# Patient Record
Sex: FEMALE | Race: WHITE | Employment: PART TIME | URBAN - METROPOLITAN AREA
[De-identification: names, ages, dates, MRNs, and addresses within clinical notes are randomized per-mention and may not be internally consistent; named-entity substitution may affect disease eponyms.]

---

## 2017-06-01 ENCOUNTER — OFFICE VISIT (OUTPATIENT)
Dept: FAMILY MEDICINE CLINIC | Facility: CLINIC | Age: 52
End: 2017-06-01

## 2017-06-01 VITALS
SYSTOLIC BLOOD PRESSURE: 120 MMHG | HEART RATE: 72 BPM | HEIGHT: 68 IN | TEMPERATURE: 97 F | DIASTOLIC BLOOD PRESSURE: 80 MMHG | WEIGHT: 201 LBS | BODY MASS INDEX: 30.46 KG/M2 | RESPIRATION RATE: 16 BRPM

## 2017-06-01 DIAGNOSIS — E06.3 HYPOTHYROIDISM DUE TO HASHIMOTO'S THYROIDITIS: ICD-10-CM

## 2017-06-01 DIAGNOSIS — Z83.3 FAMILY HISTORY OF DIABETES MELLITUS (DM): ICD-10-CM

## 2017-06-01 DIAGNOSIS — Z01.89 ROUTINE LAB DRAW: ICD-10-CM

## 2017-06-01 DIAGNOSIS — E03.8 HYPOTHYROIDISM DUE TO HASHIMOTO'S THYROIDITIS: ICD-10-CM

## 2017-06-01 DIAGNOSIS — Z82.49 FAMILY HISTORY OF CARDIOMYOPATHY: Primary | ICD-10-CM

## 2017-06-01 PROCEDURE — 99202 OFFICE O/P NEW SF 15 MIN: CPT | Performed by: NURSE PRACTITIONER

## 2017-06-01 NOTE — PATIENT INSTRUCTIONS
Thank you for choosing [facility] at [place] [address]  To Do: Annie Padilla  1.  Schedule appt for ECHO  2. Schedule for physical exam (Dr. Rubi Barrett) and blood work    • Please signup for [facility] CHART, which is electronic access to your record your quality of life.     Referrals, and Radiology Information:    If your insurance requires a referral to a specialist, please allow 5 business days to process your referral request.    If [facility] Mcdowell orders a CT or MRI, it may take up to 10 bus

## 2017-06-01 NOTE — PROGRESS NOTES
[facility] Internal Medicine Office Note  Chief Complaint:   Patient presents with:  Family Hx Of Heart Disease: states her Mom was DX with Left ventricular Noncompaction Cardiomyopathy, she was told to get Echo done for screening.  States for the Rfl:          REVIEW OF SYSTEMS:   Review of Systems   Constitutional: Negative for fever, chills and fatigue. HENT: Negative for congestion, ear pain, postnasal drip, sinus pressure and trouble swallowing. Eyes: Negative.     Respiratory: Negative for cardiomyopathy  -     CARD ECHO 2D DOPPLER (CPT=93306); Future    1.   Mother was recently DX with Left ventricular Noncompaction Cardiomyopathy, she was told to get Echo done for screening per mothers Cardiologist.    2. See detailed HPI- random symptom ev immunity disorders     Screening for lipoid disorders     Screening for iron deficiency anemia     Otalgia, unspecified     Unspecified asthma(493.90)     Family history of breast cancer     Hypothyroidism due to Hashimoto's thyroiditis      Networked refe

## 2017-06-15 ENCOUNTER — HOSPITAL ENCOUNTER (OUTPATIENT)
Dept: CV DIAGNOSTICS | Age: 52
Discharge: HOME | End: 2017-06-15
Attending: NURSE PRACTITIONER
Payer: COMMERCIAL

## 2017-06-15 DIAGNOSIS — Z82.49 FAMILY HISTORY OF CARDIOMYOPATHY: ICD-10-CM

## 2017-06-15 PROCEDURE — 93306 TTE W/DOPPLER COMPLETE: CPT | Performed by: NURSE PRACTITIONER

## 2017-06-19 NOTE — PROGRESS NOTES
Quick Note:    Your ECHO is a normal study. It does shows mild diastolic dysfunction, but w/ normal size, normal ejection fraction (pumping). We need to make sure your blood pressure is within normal limits (which was normal with last visit).  Healthy diet,

## 2017-07-05 NOTE — ADDENDUM NOTE
Encounter addended by: Stanley Bettencourt MA on: 7/5/2017  8:51 AM<BR>    Actions taken: Letter status changed

## 2017-07-19 ENCOUNTER — APPOINTMENT (OUTPATIENT)
Dept: LAB | Age: 52
End: 2017-07-19
Attending: OBSTETRICS & GYNECOLOGY
Payer: COMMERCIAL

## 2017-07-22 ENCOUNTER — OFFICE VISIT (OUTPATIENT)
Dept: FAMILY MEDICINE CLINIC | Facility: CLINIC | Age: 52
End: 2017-07-22

## 2017-07-22 VITALS
BODY MASS INDEX: 30.06 KG/M2 | DIASTOLIC BLOOD PRESSURE: 70 MMHG | HEIGHT: 68 IN | TEMPERATURE: 98 F | HEART RATE: 72 BPM | RESPIRATION RATE: 16 BRPM | SYSTOLIC BLOOD PRESSURE: 110 MMHG | WEIGHT: 198.38 LBS

## 2017-07-22 DIAGNOSIS — Z00.00 WELLNESS EXAMINATION: Primary | ICD-10-CM

## 2017-07-22 DIAGNOSIS — Z12.11 SCREENING FOR COLON CANCER: ICD-10-CM

## 2017-07-22 PROCEDURE — 99386 PREV VISIT NEW AGE 40-64: CPT | Performed by: INTERNAL MEDICINE

## 2017-07-22 NOTE — PATIENT INSTRUCTIONS
Thank you for choosing Paul Buckley MD at [place] [address]  To Do: Zander Cabrera  1. Increase exercise  2. Call to schedule appointment with gastroenterology  3.  Follow up for physical and blood work in 1 year  Effective 6/19/17 until November 20 today.  All therapies have potential risk of harm or side effects or medication interactions.  It is your duty and for your safety to discuss with the pharmacist and our office with questions, and to notify us and stop treatment if problems arise, but know

## 2017-07-22 NOTE — PROGRESS NOTES
[facility] Internal Medicine Office Note  Chief Complaint:   Patient presents with:  Wellness Visit: last labs 7/19/17      HPI:   This is a 47year old female coming in for physical    She is feeling well     She is up to date with mammo, pap sm Prescriptions:  SYNTHROID 125 MCG Oral Tab Take 1 tablet (125 mcg total) by mouth before breakfast. Disp: 90 tablet Rfl: 1   Mometasone Furoate (ASMANEX 120 METERED DOSES) 220 MCG/INH Inhalation Aerosol Powder, Breath Activated Inhale 1 puff into the lungs posterior oropharyngeal erythema. Eyes: Conjunctivae are normal.   Neck: Neck supple. No thyromegaly present. Cardiovascular: Normal rate, regular rhythm and normal heart sounds.     Pulmonary/Chest: Effort normal and breath sounds normal.   Abdominal:

## 2017-10-04 ENCOUNTER — HOSPITAL ENCOUNTER (OUTPATIENT)
Dept: MAMMOGRAPHY | Age: 52
Discharge: HOME | End: 2017-10-04
Attending: OBSTETRICS & GYNECOLOGY
Payer: COMMERCIAL

## 2017-10-04 DIAGNOSIS — Z12.31 ENCOUNTER FOR SCREENING MAMMOGRAM FOR MALIGNANT NEOPLASM OF BREAST: ICD-10-CM

## 2017-10-04 PROCEDURE — 77067 SCR MAMMO BI INCL CAD: CPT | Performed by: OBSTETRICS & GYNECOLOGY

## 2017-10-04 PROCEDURE — 77063 BREAST TOMOSYNTHESIS BI: CPT | Performed by: OBSTETRICS & GYNECOLOGY

## 2017-10-18 ENCOUNTER — APPOINTMENT (OUTPATIENT)
Dept: LAB | Age: 52
End: 2017-10-18
Attending: INTERNAL MEDICINE
Payer: COMMERCIAL

## 2017-10-18 DIAGNOSIS — E03.8 HYPOTHYROIDISM DUE TO HASHIMOTO'S THYROIDITIS: ICD-10-CM

## 2017-10-18 DIAGNOSIS — E06.3 HYPOTHYROIDISM DUE TO HASHIMOTO'S THYROIDITIS: ICD-10-CM

## 2017-10-18 DIAGNOSIS — E03.9 HYPOTHYROIDISM, UNSPECIFIED TYPE: ICD-10-CM

## 2017-10-18 PROCEDURE — 84439 ASSAY OF FREE THYROXINE: CPT

## 2017-10-18 PROCEDURE — 84443 ASSAY THYROID STIM HORMONE: CPT

## 2017-10-18 PROCEDURE — 36415 COLL VENOUS BLD VENIPUNCTURE: CPT

## 2017-12-08 ENCOUNTER — MED REC SCAN ONLY (OUTPATIENT)
Dept: FAMILY MEDICINE CLINIC | Facility: CLINIC | Age: 52
End: 2017-12-08

## 2018-02-04 ENCOUNTER — OFFICE VISIT (OUTPATIENT)
Dept: FAMILY MEDICINE CLINIC | Facility: CLINIC | Age: 53
End: 2018-02-04

## 2018-02-04 VITALS
SYSTOLIC BLOOD PRESSURE: 112 MMHG | HEART RATE: 96 BPM | OXYGEN SATURATION: 98 % | DIASTOLIC BLOOD PRESSURE: 64 MMHG | BODY MASS INDEX: 30.16 KG/M2 | RESPIRATION RATE: 20 BRPM | HEIGHT: 68 IN | TEMPERATURE: 99 F | WEIGHT: 199 LBS

## 2018-02-04 DIAGNOSIS — J02.9 SORE THROAT: ICD-10-CM

## 2018-02-04 DIAGNOSIS — J11.1 INFLUENZA-LIKE ILLNESS: Primary | ICD-10-CM

## 2018-02-04 LAB — CONTROL LINE PRESENT WITH A CLEAR BACKGROUND (YES/NO): YES YES/NO

## 2018-02-04 PROCEDURE — 87880 STREP A ASSAY W/OPTIC: CPT | Performed by: PHYSICIAN ASSISTANT

## 2018-02-04 PROCEDURE — 99213 OFFICE O/P EST LOW 20 MIN: CPT | Performed by: PHYSICIAN ASSISTANT

## 2018-02-04 RX ORDER — OSELTAMIVIR PHOSPHATE 75 MG/1
75 CAPSULE ORAL 2 TIMES DAILY
Qty: 10 CAPSULE | Refills: 0 | Status: SHIPPED | OUTPATIENT
Start: 2018-02-04 | End: 2018-02-09

## 2018-02-04 RX ORDER — OSELTAMIVIR PHOSPHATE 75 MG/1
75 CAPSULE ORAL 2 TIMES DAILY
Qty: 10 CAPSULE | Refills: 0 | Status: SHIPPED | OUTPATIENT
Start: 2018-02-04 | End: 2018-02-04

## 2018-02-04 NOTE — PROGRESS NOTES
CHIEF COMPLAINT:   Patient presents with:  Fever: bodyaches and sore throat x this morning      HPI:   Timoteo Mccollum is a 47year old female who presents for sudden onset of flu-like symptoms. Symptoms began this morning.   Patient reports sudden onset REVIEW OF SYSTEMS:   GENERAL:   Normal appetite.     SKIN: no rashes or abnormal skin lesions  HEENT: See HPI  LUNGS: denies shortness of breath or wheezing, See HPI  CARDIOVASCULAR: denies chest pain or palpitations   GI: denies abdominal pain      EXAM: PLAN:  Counseled on Tamiflu. Explained that Tamiflu may lessen severity and duration of symptoms, but will not completely remove symptoms. Side effects of medication discussed. Patient and I agree that Tamiflu is appropriate.  Comfort care as described · Nausea and loss of appetite are common with the flu. Eat light meals. Drink 6 to 8 glasses of liquids every day. Good choices are water, sport drinks, soft drinks without caffeine, juices, tea, and soup.  Extra fluids will also help loosen secretions in y

## 2018-02-04 NOTE — PATIENT INSTRUCTIONS
1. Tamiflu  2. Tylenol/ Motrin  3. Increase fluids/ rest  4. At home inhalers as needed  5. If worsening symptoms seek treatment  6. Follow up with PCP  Influenza (Adult)    Influenza is also called the flu.  It is a viral illness that affects the air pas · Stay home until your fever has been gone for at least 24 hours without using medicine to reduce fever. Follow-up care  Follow up with your healthcare provider, or as advised, if you are not getting better over the next week.   If you are age 72 or older,

## 2018-09-27 ENCOUNTER — HOSPITAL ENCOUNTER (OUTPATIENT)
Dept: GENERAL RADIOLOGY | Age: 53
Discharge: HOME | End: 2018-09-27
Attending: FAMILY MEDICINE
Payer: COMMERCIAL

## 2018-09-27 ENCOUNTER — OFFICE VISIT (OUTPATIENT)
Dept: FAMILY MEDICINE CLINIC | Facility: CLINIC | Age: 53
End: 2018-09-27
Payer: COMMERCIAL

## 2018-09-27 VITALS
HEART RATE: 98 BPM | HEIGHT: 68 IN | OXYGEN SATURATION: 98 % | RESPIRATION RATE: 16 BRPM | WEIGHT: 205 LBS | DIASTOLIC BLOOD PRESSURE: 80 MMHG | SYSTOLIC BLOOD PRESSURE: 122 MMHG | TEMPERATURE: 98 F | BODY MASS INDEX: 31.07 KG/M2

## 2018-09-27 DIAGNOSIS — Z23 FLU VACCINE NEED: ICD-10-CM

## 2018-09-27 DIAGNOSIS — M75.102 ROTATOR CUFF SYNDROME OF LEFT SHOULDER: ICD-10-CM

## 2018-09-27 DIAGNOSIS — J45.20 MILD INTERMITTENT ASTHMA, UNSPECIFIED WHETHER COMPLICATED: ICD-10-CM

## 2018-09-27 DIAGNOSIS — Z13.220 LIPID SCREENING: ICD-10-CM

## 2018-09-27 DIAGNOSIS — M54.2 NECK PAIN: ICD-10-CM

## 2018-09-27 DIAGNOSIS — Z12.11 COLON CANCER SCREENING: ICD-10-CM

## 2018-09-27 DIAGNOSIS — Z00.00 WELLNESS EXAMINATION: Primary | ICD-10-CM

## 2018-09-27 PROCEDURE — 90471 IMMUNIZATION ADMIN: CPT | Performed by: FAMILY MEDICINE

## 2018-09-27 PROCEDURE — 90686 IIV4 VACC NO PRSV 0.5 ML IM: CPT | Performed by: FAMILY MEDICINE

## 2018-09-27 PROCEDURE — 73030 X-RAY EXAM OF SHOULDER: CPT | Performed by: FAMILY MEDICINE

## 2018-09-27 PROCEDURE — 72050 X-RAY EXAM NECK SPINE 4/5VWS: CPT | Performed by: FAMILY MEDICINE

## 2018-09-27 PROCEDURE — 99386 PREV VISIT NEW AGE 40-64: CPT | Performed by: FAMILY MEDICINE

## 2018-09-27 PROCEDURE — 99203 OFFICE O/P NEW LOW 30 MIN: CPT | Performed by: FAMILY MEDICINE

## 2018-09-27 NOTE — PATIENT INSTRUCTIONS
Shoulder Clock Exercise    To start, stand tall with your ears, shoulders, and hips in line. Your feet should be slightly apart, positioned just under your hips. Focus your eyes directly in front of you.   this position for a few seconds b · Move your arms back, squeezing your shoulder blades together. · Hold for 10 seconds.  Return to starting position.   · Repeat 5 times.   For your safety, check with your healthcare provider before starting an exercise program.   Date Last Reviewed: 11/1/

## 2018-09-27 NOTE — PROGRESS NOTES
Patient presents with:  Shoulder Pain: L shoulder pain x2weeks   Establish Care: new to PCP      HPI:    Left shoulder pain: Onset of symptoms: 3 weeks. The patient does not describe a history of trauma.  The patient is right hand dominant and describes mikki fatigue   HENT: Negative for hearing loss, congestion, sore throat    Eyes: Negative for pain and visual disturbance. Respiratory: Negative for cough, chest tightness, shortness of breath and wheezing.     Cardiovascular: Negative for chest pain, palpitat • Cancer Other    • Hypertension Father    • Heart Disorder Father         stent   • Breast Cancer Paternal Aunt         63's   • Breast Cancer Paternal Cousin Female         late 29's early 42's   • Breast Cancer Paternal Cousin Female         late 29's normal. Oropharynx is clear and moist.   Eyes: Conjunctivae and EOM are normal. PERRLA. Neck: Normal range of motion. Neck supple. Normal carotid pulses   Cardiovascular: Normal rate, regular rhythm and intact distal pulses. No murmur, rubs or gallops. cervical cancer in women ages 24 to 72 years with cytology (Pap smear) every 3 years or, for women ages 27 to 72 years who want to lengthen the screening interval, screening with a combination of cytology and human papillomavirus (HPV) testing every 5 year GASTROENTEROLOGY      Return if symptoms worsen or fail to improve.

## 2018-09-28 PROBLEM — J45.20 MILD INTERMITTENT ASTHMA: Status: ACTIVE | Noted: 2018-09-28

## 2018-09-28 PROBLEM — M48.02 CERVICAL SPINAL STENOSIS: Status: ACTIVE | Noted: 2018-09-28

## 2018-09-28 PROBLEM — J45.20 MILD INTERMITTENT ASTHMA (HCC): Status: ACTIVE | Noted: 2018-09-28

## 2018-09-28 PROBLEM — M54.2 NECK PAIN: Status: ACTIVE | Noted: 2018-09-28

## 2018-09-28 PROBLEM — M54.12 CERVICAL RADICULOPATHY: Status: ACTIVE | Noted: 2018-09-28

## 2018-09-28 PROBLEM — M75.102 ROTATOR CUFF SYNDROME OF LEFT SHOULDER: Status: ACTIVE | Noted: 2018-09-28

## 2018-09-28 PROBLEM — M75.82 TENDINITIS OF LEFT ROTATOR CUFF: Status: ACTIVE | Noted: 2018-09-28

## 2018-10-24 ENCOUNTER — APPOINTMENT (OUTPATIENT)
Dept: LAB | Age: 53
End: 2018-10-24
Attending: INTERNAL MEDICINE
Payer: COMMERCIAL

## 2018-10-24 DIAGNOSIS — E03.8 HYPOTHYROIDISM DUE TO HASHIMOTO'S THYROIDITIS: ICD-10-CM

## 2018-10-24 DIAGNOSIS — E06.3 HYPOTHYROIDISM DUE TO HASHIMOTO'S THYROIDITIS: ICD-10-CM

## 2018-10-24 PROCEDURE — 80053 COMPREHEN METABOLIC PANEL: CPT

## 2018-10-24 PROCEDURE — 83036 HEMOGLOBIN GLYCOSYLATED A1C: CPT

## 2018-10-24 PROCEDURE — 36415 COLL VENOUS BLD VENIPUNCTURE: CPT

## 2018-10-31 ENCOUNTER — HOSPITAL ENCOUNTER (OUTPATIENT)
Dept: MAMMOGRAPHY | Age: 53
Discharge: HOME | End: 2018-10-31
Attending: OBSTETRICS & GYNECOLOGY
Payer: COMMERCIAL

## 2018-10-31 DIAGNOSIS — Z01.419 ENCOUNTER FOR GYNECOLOGICAL EXAMINATION WITHOUT ABNORMAL FINDING: ICD-10-CM

## 2018-10-31 DIAGNOSIS — Z78.0 MENOPAUSE: ICD-10-CM

## 2018-10-31 PROCEDURE — 77063 BREAST TOMOSYNTHESIS BI: CPT | Performed by: OBSTETRICS & GYNECOLOGY

## 2018-10-31 PROCEDURE — 77067 SCR MAMMO BI INCL CAD: CPT | Performed by: OBSTETRICS & GYNECOLOGY

## 2018-11-03 ENCOUNTER — APPOINTMENT (OUTPATIENT)
Dept: LAB | Age: 53
End: 2018-11-03
Attending: FAMILY MEDICINE
Payer: COMMERCIAL

## 2018-11-03 DIAGNOSIS — E06.3 HYPOTHYROIDISM DUE TO HASHIMOTO'S THYROIDITIS: ICD-10-CM

## 2018-11-03 DIAGNOSIS — Z13.220 LIPID SCREENING: ICD-10-CM

## 2018-11-03 DIAGNOSIS — E03.8 HYPOTHYROIDISM DUE TO HASHIMOTO'S THYROIDITIS: ICD-10-CM

## 2018-11-03 PROCEDURE — 84439 ASSAY OF FREE THYROXINE: CPT

## 2018-11-03 PROCEDURE — 80061 LIPID PANEL: CPT

## 2018-11-03 PROCEDURE — 84443 ASSAY THYROID STIM HORMONE: CPT

## 2018-11-03 PROCEDURE — 36415 COLL VENOUS BLD VENIPUNCTURE: CPT

## 2019-05-17 ENCOUNTER — OFFICE VISIT (OUTPATIENT)
Dept: FAMILY MEDICINE CLINIC | Facility: CLINIC | Age: 54
End: 2019-05-17
Payer: COMMERCIAL

## 2019-05-17 VITALS
RESPIRATION RATE: 20 BRPM | SYSTOLIC BLOOD PRESSURE: 122 MMHG | BODY MASS INDEX: 32.48 KG/M2 | TEMPERATURE: 99 F | OXYGEN SATURATION: 98 % | DIASTOLIC BLOOD PRESSURE: 84 MMHG | HEIGHT: 67.5 IN | HEART RATE: 90 BPM | WEIGHT: 209.38 LBS

## 2019-05-17 DIAGNOSIS — R50.9 FEVER AND CHILLS: Primary | ICD-10-CM

## 2019-05-17 DIAGNOSIS — R68.89 FLU-LIKE SYMPTOMS: ICD-10-CM

## 2019-05-17 PROCEDURE — 99213 OFFICE O/P EST LOW 20 MIN: CPT | Performed by: NURSE PRACTITIONER

## 2019-05-17 PROCEDURE — 87502 INFLUENZA DNA AMP PROBE: CPT | Performed by: NURSE PRACTITIONER

## 2019-05-17 RX ORDER — OSELTAMIVIR PHOSPHATE 75 MG/1
75 CAPSULE ORAL 2 TIMES DAILY
Qty: 10 CAPSULE | Refills: 0 | Status: SHIPPED | OUTPATIENT
Start: 2019-05-17 | End: 2019-05-22

## 2019-05-17 NOTE — PROGRESS NOTES
CHIEF COMPLAINT:   Patient presents with:  Fever      HPI:   Timoteo Mccollum is a 49year old female who presents for upper respiratory symptoms for  1 days. Patient reports congestion, low grade fever, dry cough, bodyaches.  Symptoms have been worse Social History    Tobacco Use      Smoking status: Never Smoker      Smokeless tobacco: Never Used    Alcohol use: Yes      Comment: once a month - rare    Drug use: No        REVIEW OF SYSTEMS:   GENERAL: fatigue, malaise, low grade fevers, normal appetit Risks, benefits, and side effects of medication explained and discussed. Patient Instructions       The Flu (Influenza)     The virus that causes the flu spreads through the air in droplets when someone who has the flu coughs, sneezes, laughs, or talks. · People who live in a nursing home or long-term care facility   How is the flu treated? The flu usually gets better after 7 days or so. In some cases, your healthcare provider may prescribe an antiviral medicine.  This may help you get well a little soone · One of the best ways to prevent the flu is to get a flu vaccine each year. The CDC recommends that all people 10months of age and older get a flu vaccine every year. The virus that causes the flu changes from year to year.  For that reason, healthcare pro · Rub your hands together briskly, cleaning the backs of your hands, the palms, between your fingers, and up the wrists. · Rub until the gel is gone and your hands are completely dry.   Preventing the flu in healthcare settings  The flu is a special concer

## 2019-05-17 NOTE — PATIENT INSTRUCTIONS
The Flu (Influenza)     The virus that causes the flu spreads through the air in droplets when someone who has the flu coughs, sneezes, laughs, or talks. The flu (influenza) is an infection that affects your respiratory tract.  This tract is made up of The flu usually gets better after 7 days or so. In some cases, your healthcare provider may prescribe an antiviral medicine. This may help you get well a little sooner.  For the medicine to help, you need to take it as soon as possible (ideally within 48 ho · One of the best ways to prevent the flu is to get a flu vaccine each year. The CDC recommends that all people 10months of age and older get a flu vaccine every year. The virus that causes the flu changes from year to year.  For that reason, healthcare pro · Rub your hands together briskly, cleaning the backs of your hands, the palms, between your fingers, and up the wrists. · Rub until the gel is gone and your hands are completely dry.   Preventing the flu in healthcare settings  The flu is a special concer

## 2019-06-26 PROCEDURE — 87624 HPV HI-RISK TYP POOLED RSLT: CPT | Performed by: OBSTETRICS & GYNECOLOGY

## 2019-06-26 PROCEDURE — 88175 CYTOPATH C/V AUTO FLUID REDO: CPT | Performed by: OBSTETRICS & GYNECOLOGY

## 2019-07-26 ENCOUNTER — OFFICE VISIT (OUTPATIENT)
Dept: FAMILY MEDICINE CLINIC | Facility: CLINIC | Age: 54
End: 2019-07-26
Payer: COMMERCIAL

## 2019-07-26 VITALS
WEIGHT: 208.63 LBS | DIASTOLIC BLOOD PRESSURE: 84 MMHG | SYSTOLIC BLOOD PRESSURE: 122 MMHG | HEART RATE: 101 BPM | TEMPERATURE: 99 F | RESPIRATION RATE: 24 BRPM | BODY MASS INDEX: 32 KG/M2 | OXYGEN SATURATION: 98 %

## 2019-07-26 DIAGNOSIS — Z51.89 VISIT FOR WOUND CHECK: Primary | ICD-10-CM

## 2019-07-26 PROCEDURE — 99212 OFFICE O/P EST SF 10 MIN: CPT | Performed by: NURSE PRACTITIONER

## 2019-07-26 NOTE — PATIENT INSTRUCTIONS
Wound Check, No Infection  Your wound is healing as expected. There are no signs of infection.   Home care  Continue to care for your wound as directed. · Cover your wound with a bandage unless your healthcare provider tells you not to.   · Gently clean

## 2019-07-26 NOTE — PROGRESS NOTES
Patient presents with:  Wound: basal cell removal, drainage, upper back      HPI:    Yelena Ortiz is a 49year old female presents for post-surgical wound check. On 7/15/19, she had excision of basal cell on right upper back.  She was told by her son LIGATION        Family History   Problem Relation Age of Onset   • Cancer Other    • Hypertension Father    • Heart Disorder Father         stent   • Breast Cancer Paternal Aunt         63's   • Breast Cancer Paternal Cousin Female         late 29's early 48year old female who presents with:    Visit for wound check  (primary encounter diagnosis)    Meds & Refills for this Visit:  Requested Prescriptions      No prescriptions requested or ordered in this encounter       Imaging & Consults:  None      Skin

## 2019-10-29 ENCOUNTER — TELEPHONE (OUTPATIENT)
Dept: FAMILY MEDICINE CLINIC | Facility: CLINIC | Age: 54
End: 2019-10-29

## 2019-10-29 ENCOUNTER — HOSPITAL ENCOUNTER (EMERGENCY)
Facility: HOSPITAL | Age: 54
Discharge: LEFT BEFORE DISCHARGE | End: 2019-10-29
Payer: COMMERCIAL

## 2019-10-29 VITALS
DIASTOLIC BLOOD PRESSURE: 84 MMHG | RESPIRATION RATE: 18 BRPM | WEIGHT: 195 LBS | BODY MASS INDEX: 29.55 KG/M2 | TEMPERATURE: 99 F | SYSTOLIC BLOOD PRESSURE: 148 MMHG | HEART RATE: 85 BPM | HEIGHT: 68 IN | OXYGEN SATURATION: 100 %

## 2019-10-29 NOTE — TELEPHONE ENCOUNTER
Patient called our office to let us know she recently checked her blood pressure and it has been reading 150/176-110-111. She also has some pressure in her head with the hypertension. At the time of the call, pt was getting emotional on the phone.  I asked

## 2019-10-29 NOTE — ED NOTES
Pt would like to leave . md updated.  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 md updated

## 2019-10-29 NOTE — ED NOTES
RN checked patient's BP and it was 148/84. Patient checked right after with home device on her wrist on the same arm and got a reading of 170/106. RN rechecked upper arm BP on the same arm and got 142/87.

## 2019-10-29 NOTE — ED INITIAL ASSESSMENT (HPI)
Patient presents with a \"whooshing sensation\" at the top of her head for the past 2 weeks. She reports she purchased a blood pressure cough and has noticed several elevated blood pressures with the highest being 176/111. Denies chest pain.

## 2019-10-29 NOTE — ED NOTES
Pt sitting up on stretcher asking if she can leave. Pt stating she purchased a new blood pressure cuff and it appeared she was HTN. Pt stating she was concerned and called her PMD whom sent pt to ER. Pt blood pressure here 141/86.  Pt does not have a hx of

## 2019-11-02 ENCOUNTER — HOSPITAL ENCOUNTER (OUTPATIENT)
Dept: MAMMOGRAPHY | Facility: HOSPITAL | Age: 54
Discharge: HOME | End: 2019-11-02
Attending: OBSTETRICS & GYNECOLOGY
Payer: COMMERCIAL

## 2019-11-02 DIAGNOSIS — Z01.419 ENCOUNTER FOR GYNECOLOGICAL EXAMINATION WITHOUT ABNORMAL FINDING: ICD-10-CM

## 2019-11-02 DIAGNOSIS — Z12.31 ENCOUNTER FOR SCREENING MAMMOGRAM FOR BREAST CANCER: ICD-10-CM

## 2019-11-02 PROCEDURE — 77063 BREAST TOMOSYNTHESIS BI: CPT | Performed by: OBSTETRICS & GYNECOLOGY

## 2019-11-02 PROCEDURE — 77067 SCR MAMMO BI INCL CAD: CPT | Performed by: OBSTETRICS & GYNECOLOGY

## 2019-11-05 ENCOUNTER — APPOINTMENT (OUTPATIENT)
Dept: LAB | Age: 54
End: 2019-11-05
Attending: INTERNAL MEDICINE
Payer: COMMERCIAL

## 2019-11-05 DIAGNOSIS — E06.3 HYPOTHYROIDISM DUE TO HASHIMOTO'S THYROIDITIS: ICD-10-CM

## 2019-11-05 DIAGNOSIS — E03.8 HYPOTHYROIDISM DUE TO HASHIMOTO'S THYROIDITIS: ICD-10-CM

## 2019-11-05 DIAGNOSIS — E03.9 HYPOTHYROIDISM, UNSPECIFIED TYPE: ICD-10-CM

## 2019-11-05 PROCEDURE — 36415 COLL VENOUS BLD VENIPUNCTURE: CPT

## 2019-11-05 PROCEDURE — 84439 ASSAY OF FREE THYROXINE: CPT

## 2019-11-05 PROCEDURE — 84443 ASSAY THYROID STIM HORMONE: CPT

## 2019-11-05 PROCEDURE — 80053 COMPREHEN METABOLIC PANEL: CPT

## 2019-12-12 PROBLEM — D12.3 BENIGN NEOPLASM OF TRANSVERSE COLON: Status: ACTIVE | Noted: 2019-12-12

## 2019-12-12 PROBLEM — Z80.0 FAMILY HISTORY OF COLON CANCER: Status: ACTIVE | Noted: 2019-12-12

## 2019-12-12 PROBLEM — D12.2 BENIGN NEOPLASM OF ASCENDING COLON: Status: ACTIVE | Noted: 2019-12-12

## 2019-12-12 PROBLEM — D12.4 BENIGN NEOPLASM OF DESCENDING COLON: Status: ACTIVE | Noted: 2019-12-12

## 2019-12-12 PROBLEM — D12.0 BENIGN NEOPLASM OF CECUM: Status: ACTIVE | Noted: 2019-12-12

## 2020-03-31 ENCOUNTER — E-VISIT (OUTPATIENT)
Dept: FAMILY MEDICINE CLINIC | Facility: CLINIC | Age: 55
End: 2020-03-31

## 2020-03-31 DIAGNOSIS — R05.9 COUGH: Primary | ICD-10-CM

## 2020-03-31 NOTE — PROGRESS NOTES
Ritesh Morse is a 49year old female. HPI:   See answers to questions above. Current Outpatient Medications   Medication Sig Dispense Refill   • Levothyroxine Sodium 125 MCG Oral Tab Take 1 tablet (125 mcg total) by mouth daily.  90 tablet 3   • Social History:  Social History    Tobacco Use      Smoking status: Never Smoker      Smokeless tobacco: Never Used    Alcohol use: Not Currently      Comment: once a month - rare    Drug use: No        ASSESSMENT AND PLAN:     Cough  (primary encount

## 2020-04-03 ENCOUNTER — LAB ENCOUNTER (OUTPATIENT)
Dept: LAB | Facility: HOSPITAL | Age: 55
End: 2020-04-03
Attending: NURSE PRACTITIONER
Payer: COMMERCIAL

## 2020-04-03 ENCOUNTER — TELEPHONE (OUTPATIENT)
Dept: FAMILY MEDICINE CLINIC | Facility: CLINIC | Age: 55
End: 2020-04-03

## 2020-04-03 DIAGNOSIS — Z20.822 SUSPECTED COVID-19 VIRUS INFECTION: ICD-10-CM

## 2020-04-03 DIAGNOSIS — Z20.822 SUSPECTED COVID-19 VIRUS INFECTION: Primary | ICD-10-CM

## 2020-04-03 NOTE — TELEPHONE ENCOUNTER
Patient called to report onset of body aches, dry cough, fatigue and T=99.8 on 3/30/20. All symptoms have persisted with Tmax= 100.0. Patient has not had any wheezing or a persistent cough or coughing jags.    Pt states that she has a hx of Asthma which do

## 2020-10-29 ENCOUNTER — HOSPITAL ENCOUNTER (OUTPATIENT)
Dept: MAMMOGRAPHY | Facility: HOSPITAL | Age: 55
Discharge: HOME | End: 2020-10-29
Attending: OBSTETRICS & GYNECOLOGY
Payer: COMMERCIAL

## 2020-10-29 DIAGNOSIS — Z01.419 ENCOUNTER FOR GYNECOLOGICAL EXAMINATION WITHOUT ABNORMAL FINDING: ICD-10-CM

## 2020-10-29 PROCEDURE — 77063 BREAST TOMOSYNTHESIS BI: CPT | Performed by: OBSTETRICS & GYNECOLOGY

## 2020-10-29 PROCEDURE — 77067 SCR MAMMO BI INCL CAD: CPT | Performed by: OBSTETRICS & GYNECOLOGY

## 2020-11-10 ENCOUNTER — HOSPITAL ENCOUNTER (OUTPATIENT)
Dept: BONE DENSITY | Age: 55
Discharge: HOME | End: 2020-11-10
Attending: OBSTETRICS & GYNECOLOGY
Payer: COMMERCIAL

## 2020-11-10 DIAGNOSIS — Z01.419 ENCOUNTER FOR GYNECOLOGICAL EXAMINATION WITHOUT ABNORMAL FINDING: ICD-10-CM

## 2020-11-10 PROCEDURE — 77080 DXA BONE DENSITY AXIAL: CPT | Performed by: OBSTETRICS & GYNECOLOGY

## 2021-01-22 ENCOUNTER — IMMUNIZATION (OUTPATIENT)
Dept: LAB | Age: 56
End: 2021-01-22

## 2021-01-22 DIAGNOSIS — Z23 NEED FOR VACCINATION: Primary | ICD-10-CM

## 2021-01-22 PROCEDURE — 91301 COVID-19 MODERNA VACCINE: CPT

## 2021-01-22 PROCEDURE — 0011A COVID-19 MODERNA VACCINE: CPT

## 2021-01-27 ENCOUNTER — LAB ENCOUNTER (OUTPATIENT)
Dept: LAB | Age: 56
End: 2021-01-27
Attending: INTERNAL MEDICINE
Payer: COMMERCIAL

## 2021-01-27 DIAGNOSIS — E06.3 HYPOTHYROIDISM DUE TO HASHIMOTO'S THYROIDITIS: ICD-10-CM

## 2021-01-27 DIAGNOSIS — E03.8 HYPOTHYROIDISM DUE TO HASHIMOTO'S THYROIDITIS: ICD-10-CM

## 2021-01-27 LAB
T4 FREE SERPL-MCNC: 1.5 NG/DL (ref 0.8–1.7)
TSI SER-ACNC: 0.69 MIU/ML (ref 0.36–3.74)

## 2021-01-27 PROCEDURE — 36415 COLL VENOUS BLD VENIPUNCTURE: CPT

## 2021-01-27 PROCEDURE — 84439 ASSAY OF FREE THYROXINE: CPT

## 2021-01-27 PROCEDURE — 84443 ASSAY THYROID STIM HORMONE: CPT

## 2021-02-19 ENCOUNTER — IMMUNIZATION (OUTPATIENT)
Dept: LAB | Age: 56
End: 2021-02-19
Attending: HOSPITALIST

## 2021-02-19 DIAGNOSIS — Z23 NEED FOR VACCINATION: Primary | ICD-10-CM

## 2021-02-19 PROCEDURE — 91301 COVID-19 MODERNA VACCINE: CPT | Performed by: HOSPITALIST

## 2021-02-19 PROCEDURE — 0012A COVID-19 MODERNA VACCINE: CPT | Performed by: HOSPITALIST

## 2021-05-23 ENCOUNTER — LAB ENCOUNTER (OUTPATIENT)
Dept: LAB | Facility: HOSPITAL | Age: 56
End: 2021-05-23
Attending: OTOLARYNGOLOGY
Payer: COMMERCIAL

## 2021-05-23 DIAGNOSIS — H65.493 CHRONIC EXUDATIVE OTITIS MEDIA, BILATERAL: ICD-10-CM

## 2021-08-15 ENCOUNTER — OFFICE VISIT (OUTPATIENT)
Dept: FAMILY MEDICINE CLINIC | Facility: CLINIC | Age: 56
End: 2021-08-15
Payer: COMMERCIAL

## 2021-08-15 ENCOUNTER — HOSPITAL ENCOUNTER (OUTPATIENT)
Age: 56
Discharge: STILL ADMITTED OR ADMINISTRATIVE | End: 2021-08-15
Payer: COMMERCIAL

## 2021-08-15 VITALS
TEMPERATURE: 97 F | BODY MASS INDEX: 30.31 KG/M2 | DIASTOLIC BLOOD PRESSURE: 72 MMHG | OXYGEN SATURATION: 99 % | SYSTOLIC BLOOD PRESSURE: 128 MMHG | HEART RATE: 74 BPM | WEIGHT: 200 LBS | RESPIRATION RATE: 16 BRPM | HEIGHT: 68 IN

## 2021-08-15 DIAGNOSIS — J02.9 SORE THROAT: ICD-10-CM

## 2021-08-15 DIAGNOSIS — J06.9 VIRAL UPPER RESPIRATORY TRACT INFECTION: Primary | ICD-10-CM

## 2021-08-15 DIAGNOSIS — R09.81 NASAL CONGESTION: ICD-10-CM

## 2021-08-15 LAB
CONTROL LINE PRESENT WITH A CLEAR BACKGROUND (YES/NO): YES YES/NO
KIT LOT #: NORMAL NUMERIC
OPERATOR ID: NORMAL
POCT LOT NUMBER: NORMAL
RAPID SARS-COV-2 BY PCR: NOT DETECTED
STREP GRP A CUL-SCR: NEGATIVE

## 2021-08-15 PROCEDURE — 3078F DIAST BP <80 MM HG: CPT | Performed by: NURSE PRACTITIONER

## 2021-08-15 PROCEDURE — U0002 COVID-19 LAB TEST NON-CDC: HCPCS | Performed by: NURSE PRACTITIONER

## 2021-08-15 PROCEDURE — 87880 STREP A ASSAY W/OPTIC: CPT | Performed by: NURSE PRACTITIONER

## 2021-08-15 PROCEDURE — 99213 OFFICE O/P EST LOW 20 MIN: CPT | Performed by: NURSE PRACTITIONER

## 2021-08-15 PROCEDURE — 3008F BODY MASS INDEX DOCD: CPT | Performed by: NURSE PRACTITIONER

## 2021-08-15 PROCEDURE — 3074F SYST BP LT 130 MM HG: CPT | Performed by: NURSE PRACTITIONER

## 2021-11-09 ENCOUNTER — HOSPITAL ENCOUNTER (OUTPATIENT)
Dept: MAMMOGRAPHY | Age: 56
Discharge: HOME | End: 2021-11-09
Attending: OBSTETRICS & GYNECOLOGY
Payer: COMMERCIAL

## 2021-11-09 DIAGNOSIS — Z12.31 ENCOUNTER FOR SCREENING MAMMOGRAM FOR BREAST CANCER: ICD-10-CM

## 2021-11-09 DIAGNOSIS — Z12.31 ENCOUNTER FOR SCREENING MAMMOGRAM FOR MALIGNANT NEOPLASM OF BREAST: ICD-10-CM

## 2021-11-09 PROCEDURE — 77067 SCR MAMMO BI INCL CAD: CPT | Performed by: OBSTETRICS & GYNECOLOGY

## 2021-11-09 PROCEDURE — 77063 BREAST TOMOSYNTHESIS BI: CPT | Performed by: OBSTETRICS & GYNECOLOGY

## 2022-01-02 ENCOUNTER — LAB ENCOUNTER (OUTPATIENT)
Dept: LAB | Facility: HOSPITAL | Age: 57
End: 2022-01-02
Attending: OTOLARYNGOLOGY
Payer: COMMERCIAL

## 2022-01-02 ENCOUNTER — LAB ENCOUNTER (OUTPATIENT)
Dept: LAB | Facility: HOSPITAL | Age: 57
End: 2022-01-02
Attending: INTERNAL MEDICINE
Payer: COMMERCIAL

## 2022-01-02 DIAGNOSIS — H65.493 CHRONIC EXUDATIVE OTITIS MEDIA, BILATERAL: ICD-10-CM

## 2022-01-02 DIAGNOSIS — E03.8 HYPOTHYROIDISM DUE TO HASHIMOTO'S THYROIDITIS: ICD-10-CM

## 2022-01-02 DIAGNOSIS — E06.3 HYPOTHYROIDISM DUE TO HASHIMOTO'S THYROIDITIS: ICD-10-CM

## 2022-01-02 LAB
T4 FREE SERPL-MCNC: 1 NG/DL (ref 0.8–1.7)
TSI SER-ACNC: 6.35 MIU/ML (ref 0.36–3.74)

## 2022-01-02 PROCEDURE — 36415 COLL VENOUS BLD VENIPUNCTURE: CPT

## 2022-01-02 PROCEDURE — 84439 ASSAY OF FREE THYROXINE: CPT

## 2022-01-02 PROCEDURE — 84443 ASSAY THYROID STIM HORMONE: CPT

## 2022-01-03 LAB — SARS-COV-2 RNA RESP QL NAA+PROBE: NOT DETECTED

## 2022-01-03 NOTE — PROGRESS NOTES
Spoke with pt. Has not missed any doses. Takes with vitamin D and vitamin C in the morning. Waits to eat/drink. She has always done so.

## 2022-01-04 NOTE — PROGRESS NOTES
Patient informed of 's result note. Verbalized understanding and agrees to plan.    Will wait 4 hrs in between vitamins and levothyroxine    Order placed

## 2022-02-01 ENCOUNTER — LAB ENCOUNTER (OUTPATIENT)
Dept: LAB | Age: 57
End: 2022-02-01
Attending: INTERNAL MEDICINE
Payer: COMMERCIAL

## 2022-02-01 DIAGNOSIS — E03.8 HYPOTHYROIDISM DUE TO HASHIMOTO'S THYROIDITIS: ICD-10-CM

## 2022-02-01 DIAGNOSIS — E06.3 HYPOTHYROIDISM DUE TO HASHIMOTO'S THYROIDITIS: ICD-10-CM

## 2022-02-01 LAB
T4 FREE SERPL-MCNC: 1.2 NG/DL (ref 0.8–1.7)
TSI SER-ACNC: 0.21 MIU/ML (ref 0.36–3.74)

## 2022-02-01 PROCEDURE — 84439 ASSAY OF FREE THYROXINE: CPT

## 2022-02-01 PROCEDURE — 84443 ASSAY THYROID STIM HORMONE: CPT

## 2022-02-01 PROCEDURE — 36415 COLL VENOUS BLD VENIPUNCTURE: CPT

## 2022-05-11 ENCOUNTER — LAB ENCOUNTER (OUTPATIENT)
Dept: LAB | Age: 57
End: 2022-05-11
Attending: INTERNAL MEDICINE
Payer: COMMERCIAL

## 2022-05-11 DIAGNOSIS — E06.3 HYPOTHYROIDISM DUE TO HASHIMOTO'S THYROIDITIS: ICD-10-CM

## 2022-05-11 DIAGNOSIS — E03.8 HYPOTHYROIDISM DUE TO HASHIMOTO'S THYROIDITIS: ICD-10-CM

## 2022-05-11 LAB
T4 FREE SERPL-MCNC: 1.5 NG/DL (ref 0.8–1.7)
TSI SER-ACNC: 0.2 MIU/ML (ref 0.36–3.74)

## 2022-05-11 PROCEDURE — 84439 ASSAY OF FREE THYROXINE: CPT

## 2022-05-11 PROCEDURE — 84443 ASSAY THYROID STIM HORMONE: CPT

## 2022-05-11 PROCEDURE — 36415 COLL VENOUS BLD VENIPUNCTURE: CPT

## 2022-08-02 ENCOUNTER — LAB ENCOUNTER (OUTPATIENT)
Dept: LAB | Age: 57
End: 2022-08-02
Attending: INTERNAL MEDICINE
Payer: COMMERCIAL

## 2022-08-02 DIAGNOSIS — E03.8 HYPOTHYROIDISM DUE TO HASHIMOTO'S THYROIDITIS: Primary | ICD-10-CM

## 2022-08-02 DIAGNOSIS — E06.3 HYPOTHYROIDISM DUE TO HASHIMOTO'S THYROIDITIS: Primary | ICD-10-CM

## 2022-08-02 LAB
T4 FREE SERPL-MCNC: 1.2 NG/DL (ref 0.8–1.7)
TSI SER-ACNC: 1.19 MIU/ML (ref 0.36–3.74)

## 2022-08-02 PROCEDURE — 84443 ASSAY THYROID STIM HORMONE: CPT

## 2022-08-02 PROCEDURE — 84439 ASSAY OF FREE THYROXINE: CPT

## 2022-08-02 PROCEDURE — 36415 COLL VENOUS BLD VENIPUNCTURE: CPT

## 2022-09-14 ENCOUNTER — TELEPHONE (OUTPATIENT)
Facility: LOCATION | Age: 57
End: 2022-09-14

## 2022-10-13 ENCOUNTER — ORDER TRANSCRIPTION (OUTPATIENT)
Dept: ADMINISTRATIVE | Facility: HOSPITAL | Age: 57
End: 2022-10-13

## 2022-10-13 DIAGNOSIS — Z12.31 ENCOUNTER FOR SCREENING MAMMOGRAM FOR MALIGNANT NEOPLASM OF BREAST: Primary | ICD-10-CM

## 2022-11-03 ENCOUNTER — OFFICE VISIT (OUTPATIENT)
Dept: FAMILY MEDICINE CLINIC | Facility: CLINIC | Age: 57
End: 2022-11-03
Payer: COMMERCIAL

## 2022-11-03 VITALS
HEIGHT: 68 IN | WEIGHT: 205 LBS | DIASTOLIC BLOOD PRESSURE: 76 MMHG | TEMPERATURE: 99 F | BODY MASS INDEX: 31.07 KG/M2 | OXYGEN SATURATION: 99 % | SYSTOLIC BLOOD PRESSURE: 147 MMHG | HEART RATE: 126 BPM | RESPIRATION RATE: 18 BRPM

## 2022-11-03 DIAGNOSIS — R68.89 FLU-LIKE SYMPTOMS: Primary | ICD-10-CM

## 2022-11-03 PROCEDURE — 87637 SARSCOV2&INF A&B&RSV AMP PRB: CPT | Performed by: NURSE PRACTITIONER

## 2022-11-03 PROCEDURE — 99213 OFFICE O/P EST LOW 20 MIN: CPT | Performed by: NURSE PRACTITIONER

## 2022-11-03 PROCEDURE — 3008F BODY MASS INDEX DOCD: CPT | Performed by: NURSE PRACTITIONER

## 2022-11-03 PROCEDURE — 3077F SYST BP >= 140 MM HG: CPT | Performed by: NURSE PRACTITIONER

## 2022-11-03 PROCEDURE — 3078F DIAST BP <80 MM HG: CPT | Performed by: NURSE PRACTITIONER

## 2022-11-03 RX ORDER — OSELTAMIVIR PHOSPHATE 75 MG/1
75 CAPSULE ORAL 2 TIMES DAILY
Qty: 10 CAPSULE | Refills: 0 | Status: SHIPPED | OUTPATIENT
Start: 2022-11-03 | End: 2022-11-08

## 2022-11-04 LAB
FLUAV + FLUBV RNA SPEC NAA+PROBE: NOT DETECTED
FLUAV + FLUBV RNA SPEC NAA+PROBE: NOT DETECTED
RSV RNA SPEC NAA+PROBE: NOT DETECTED
SARS-COV-2 RNA RESP QL NAA+PROBE: DETECTED

## 2022-11-22 ENCOUNTER — HOSPITAL ENCOUNTER (OUTPATIENT)
Dept: MAMMOGRAPHY | Age: 57
Discharge: HOME | End: 2022-11-22
Attending: OBSTETRICS & GYNECOLOGY
Payer: COMMERCIAL

## 2022-11-22 DIAGNOSIS — Z12.31 ENCOUNTER FOR SCREENING MAMMOGRAM FOR MALIGNANT NEOPLASM OF BREAST: ICD-10-CM

## 2022-11-22 PROCEDURE — 77063 BREAST TOMOSYNTHESIS BI: CPT | Performed by: OBSTETRICS & GYNECOLOGY

## 2022-11-22 PROCEDURE — 77067 SCR MAMMO BI INCL CAD: CPT | Performed by: OBSTETRICS & GYNECOLOGY

## 2023-12-18 PROBLEM — Z86.0101 HISTORY OF ADENOMATOUS POLYP OF COLON: Status: ACTIVE | Noted: 2023-12-18

## 2023-12-18 PROBLEM — Z86.010 HISTORY OF ADENOMATOUS POLYP OF COLON: Status: ACTIVE | Noted: 2023-12-18

## 2024-01-16 ENCOUNTER — TELEPHONE (OUTPATIENT)
Dept: GENETICS | Age: 59
End: 2024-01-16

## 2024-01-18 ENCOUNTER — NURSE ONLY (OUTPATIENT)
Dept: HEMATOLOGY/ONCOLOGY | Age: 59
End: 2024-01-18
Attending: GENETIC COUNSELOR, MS
Payer: COMMERCIAL

## 2024-01-18 ENCOUNTER — GENETICS ENCOUNTER (OUTPATIENT)
Dept: GENETICS | Age: 59
End: 2024-01-18
Attending: GENETIC COUNSELOR, MS
Payer: COMMERCIAL

## 2024-01-18 ENCOUNTER — APPOINTMENT (OUTPATIENT)
Dept: HEMATOLOGY/ONCOLOGY | Facility: HOSPITAL | Age: 59
End: 2024-01-18
Attending: GENETIC COUNSELOR, MS
Payer: COMMERCIAL

## 2024-01-18 DIAGNOSIS — Z80.0 FAMILY HISTORY OF PANCREATIC CANCER: ICD-10-CM

## 2024-01-18 DIAGNOSIS — Z80.3 FAMILY HISTORY OF BREAST CANCER: Primary | ICD-10-CM

## 2024-01-18 PROCEDURE — 36415 COLL VENOUS BLD VENIPUNCTURE: CPT

## 2024-01-18 PROCEDURE — 96040 HC GENETIC COUNSELING EA 30 MIN: CPT | Performed by: GENETIC COUNSELOR, MS

## 2024-01-18 NOTE — PROGRESS NOTES
Referring Provider:  Self    Additional Provider(s):  MD Melida Escalona DO Sushama Gundlapalli, MD    Reason for Referral:  Rupinder \"Enriqueta\" Jef was referred for genetic counseling because of a family history of cancer. Ms. Fuchs is a 58 year-old woman of [place], [place], and Citizen of the [place] descent. Ms. Fuchs's reported cancer history is notable for multiple basal cell carcinoma skin cancers. Her ovaries are intact. Menarche was at age 14. Menopause was at age 52. Ms. Fuchs's last mammogram was on 11/29/23. Ms. Fuchs has never had a breast MRI. Ms. Fuchs's 12/18/23 colonoscopy was notable for a three mm tubular adenoma in the descending colon; a repeat screening colonoscopy was recommended in three years. Ms. Fuchs's 12/12/19 colonoscopy was notable for five tubular adenomas, ranging in size from two mm to 10 mm, located in the ascending colon and transverse colon, and a two mm hyperplastic polyp in the descending colon.     Prior Germline Testing:  Ms. Fuchs had blood drawn on 12/23/13 for BRCA1/2 testing. No pathogenic variants were detected by sequencing and deletion/duplication analysis. Please refer to the report from [facility] ([id number]) that is scanned in to “Media” for additional information.     Social History:  Ms. Fuchs was seen today by herself. Ms. Fuchs lives in [place]. She is a retired ICU nurse.     Family History:   A three generation pedigree was obtained from Ms. Fuchs in 2013. This was reviewed with Ms. Fuchs today and updated accordingly.      Ms. Fuchs has two sons, ages 21 and 26.    Ms. Fuchs has two brothers and one sister. One of Ms. Fuchs's brothers was treated for tonsillar cancer at age 49. Neither of Ms. Fuchs's brothers have had genetic testing. Ms. Fuchs's sister, Shazia, reportedly tested negative for a known paternal GEE and MUTYH pathogenic variant identified in their paternal cousin.     Ms. Fuchs's mother was diagnosed  with colorectal cancer in her mid-70s and  from leukemia at age 77. Ms. Fuchs's mother had one brother and two sisters. One of Ms. Fuchs's maternal aunts may have had colorectal cancer in her mid-70s. Ms. Fuchs's maternal grandmother  in her 60s and did not have cancer. Ms. Fuchs's maternal grandfather  in his 20s and did not have cancer.     Ms. Fuchs's father is 85 years-old; he had an unspecified cancer on his leg in his 20s. Ms. Fuchs's father has one sister and no brothers. Ms. Fuchs's paternal aunt had breast cancer in her mid-60s and is living in her mid-70s. Two of Ms. Garcias paternal cousins have had breast cancer, one in her late 30s and one in her early 40s. One of Ms. Garcias paternal cousins, Joana tested positive for an GEE and a MUTYH pathogenic variant. Joana is 54 years-old and was diagnosed with pancreatic cancer and breast cancer in . Joana saw my colleague, Rosalind Monique, on 10/24/23 and had blood drawn for [facility]'s Multi-Cancers panel ([id number]). Ms. Fuchs provided me with a copy of Joana's genetic test report to review. Ms. Fuchs's paternal grandmother  at age 95 and did not have cancer. Ms. Fuchs's grandmother's mother  in her 20s from breast cancer. Ms. Fuchs's paternal grandfather  at age 90 and did not have cancer.     Please see the pedigree for additional family history information.     Counseling:   The following information was discussed with Ms. Fuchs.    GEE Pathogenic Variants:  Most GEE pathogenic variants are associated with an estimated 20-30% for breast cancer in women, although the GEE variant [id number] ([id number]) has been reported to be associated with a up to 69% for breast cancer in women. GEE pathogenic variants have also been linked to an increased risk for other cancers including pancreatic, ovarian, prostate, and colorectal; however, these risks are not as well defined and likely to change as more information  becomes available.  Recent NCCN Guidelines estimate a ~5-10% risk for pancreatic cancer and a 2-3% risk for ovarian cancer. Biallelic GEE mutations result in ataxia-telangiectasia.        MUTYH Pathogenic Variants:  Approximately 1%-2% of the general population are monoallelic MUTYH carriers (Kieran DAVE, et al. J Clin Oncol 2017;35:5527-4433; Addi AB, et al. Fam Cancer 2022;231:415-422). Current NCCN Guidelines state that there is currently no increased risk for colorectal cancer in carriers of a single MUTYH pathogenic variant and that general population screening is appropriate for these individuals.      Individuals who inherit a MUTYH mutation from each parent have the condition MUTYH-associated polyposis (MAP).  Affected individuals typically develop moderate polyposis by their 30s-50s with colorectal cancer developing at a mean age of 50. Per current NCCN guidelines, individuals with two (biallelic) MUTYH mutations should begin screening colonoscopies between 25-30 years of age and repeat them more frequently (e.g., every 1-2 years if no polyps are found, or more frequently depending on number, size, and type of polyps found).      Risk Assessment:   Ms. Fuchs meets NCCN Guidelines testing criteria for updated testing for high-penetrance breast cancer susceptibility genes based on her reported paternal family history of early-onset breast cancer. Ms. Fuchs has a 12.5% chance to carry an GEE pathogenic variant based on her reported paternal family history. Ms. Fuchs has 12.5% (1 in 8) chance to carry a single MUTYH pathogenic variant and a 0.03% (1 in 3200) chance to carry biallelic pathogenic variants based on her reportedly family history. There are no prediction models or testing criteria for cancer predisposition genes such as CHEK2 and PALB2. I recommend that testing be performed as part of a multigene panel.     Genetic Testing (Panel):  The pros, cons, and limitations of genetic testing were  discussed including the potential implications of test results on clinical management.     If a pathogenic variant is not identified (negative result), it is still possible that Ms. Fuchs has a pathogenic variant in one of these genes that was not detected by the genetic test, or that the family is dealing with a hereditary cancer syndrome involving a different gene. It is also possible that Ms. Fuchs's relatives have a pathogenic variant in one of these genes that Ms. Fuchs did not inherit. In this scenario, options for cancer screening/management should be determined according to personal and family histories and should be discussed with a physician.      A variant of uncertain significance is a DNA change that may or may not alter the function of the gene; therefore, it is usually not possible to determine if the gene variant is responsible for an individual's increased cancer risk.     If Ms. Fuchs is found to carry a pathogenic variant in a cancer predisposition gene, she is at significantly increased risk for various cancers. The magnitude of these risks, and the cancers for which she is at increased risk would depend on the gene involved. Medical recommendations for individuals with GEE and MUTYH pathogenic variants were reviewed as an example. It was also explained that for some of the genes for which testing is available, the associated cancer risks have yet to be determined and medical management recommendations may not yet be available for individuals with pathogenic variants in these genes. If she were to test positive for a pathogenic variant, her sons and siblings would each have a 50% chance of carrying the same variant. At-risk adults (>18) would have the option of pursuing targeted genetic testing to clarify their cancer risks. Genetic test results have implications for the entire biological family. Thus, it is recommended that she share her genetic test results with her biological family  members so that they may have their risk assessed.     Genetic Information Non-Discrimination Act:  The legal protections of the Genetic Information Nondiscrimination Act (JORGE) for health insurance and employment were discussed.  JORGE does not provide protection for life insurance, disability or long-term care insurance.    Summary and Plan:  Ms. Fuchs was referred for genetic counseling because of a family history of cancer. Her reported family history is suspicious for a hereditary cancer syndrome. Genetic testing on Ms. Fuchs for GEE and MUTYH pathogenic variants as part of a multigene panel is indicated.      At the conclusion of the counseling session Ms. Fuchs decided to proceed with genetic testing. Written consent was obtained. Blood and paperwork were [facility] to [facility] for their Common Hereditary Cancers panel. I anticipate that Ms. Fuchs's results will be available within 2-3 weeks and will call her with the results.  Results will also be communicated to Dr. Hope, Dr. Pettit, and Dr. Jang.    Approximately 40 minutes was spent in consultation with Ms. Fuchs.

## 2024-02-05 ENCOUNTER — GENETICS ENCOUNTER (OUTPATIENT)
Dept: HEMATOLOGY/ONCOLOGY | Facility: HOSPITAL | Age: 59
End: 2024-02-05

## 2024-02-05 NOTE — PROGRESS NOTES
Referring Provider:                    Self     Additional Provider(s):              MD Melida Escalona DO Sushama Gundlapalli, MD    Reason for Referral:  Rupinder Fuchs had genetic testing performed on 01/18/24 because of a family history of cancer.     Genetic Testing Result:  NEGATIVE -  No known pathogenic variants were found in 48 genes including: APC*, GEE*, AXIN2, BAP1, BARD1, BMPR1A, BRCA1, BRCA2, BRIP1, CDH1, CDK4, CDKN2A (p14ARF), CDKN2A ([id number]), CHEK2, CTNNA1, DICER1*, EPCAM*, FH*, GREM1*, HOXB13, KIT, MBD4, MEN1*, MLH1*, MSH2*, MSH3*, MSH6*, MUTYH, NF1*, NTHL1, PALB2, PDGFRA, PMS2*, POLD1*, POLE, PTEN*, RAD51C, RAD51D, SDHA*, SDHB, SDHC*, SDHD, SMAD4, SMARCA4, STK11, TP53, TSC1*, TSC2, VHL. Please refer to the report from [employer] ([id number]) for additional testing information. These results were discussed with Ms. Fuchs by phone on 01/29/24.      Summary and Plan:  These results indicate that Ms. Fuchs did not inherit the known paternal GEE and MUTYH pathogenic variants identified in Ms. Fuchs's paternal cousin. These results also indicate that it is unlikely that Ms. Fuchs has a pathogenic variant in any of the genes listed above. The limitations of the testing include the chance that a pathogenic variant in a gene other than those included in this analysis might be the cause of cancer in Ms. Fuchs's other relatives. I encourage Ms. Fuchs to contact me on an annual basis to see if there have been any updates in genetic testing that would apply to her or to inform me if there are any changes to the family history.    In the meantime, Ms. Fuchs and her relatives should speak with their physicians to discuss recommended medical management according to their personal and family history.    Cc:  Rupinder Fuchs

## 2024-09-03 ENCOUNTER — TELEPHONE (OUTPATIENT)
Dept: HEMATOLOGY/ONCOLOGY | Facility: HOSPITAL | Age: 59
End: 2024-09-03

## 2024-09-03 NOTE — TELEPHONE ENCOUNTER
Returned patient's call, she received a call from [phone] informing her that she has a balance of $650.84 for her January genetic testing. That patient's insurance paid, but pt owes this amount. Patient explained that she was tested through the \"[facility]\" ([facility]) program. Person she spoke with said that was not the case.     I double checked in [facility] portal, testing was ordered under F[facility] program. Portal message from Sushant Brown states:    Sushant Brown 9/3/2024 1:33 PM  Rogelio Velasco, Thank you for reaching out to me. You are correct due to the order is [facility] testing. We will submit a claim to the patient insurance. The patient shouldnt receive a bill from [facility]. Please let me know if you have any questions. Sushant Brown    I advised patient of above and asked her to tell them to call me at [phone] if they call again.

## 2024-12-01 ENCOUNTER — MED REC SCAN ONLY (OUTPATIENT)
Dept: FAMILY MEDICINE CLINIC | Facility: CLINIC | Age: 59
End: 2024-12-01

## 2024-12-05 NOTE — PROGRESS NOTES
30w4d CHIEF COMPLAINT:   Patient presents with:  Covid: Need Rapid Test - Entered by patient      HPI:   Lincoln Gay is a 55year old female who presents for upper respiratory symptoms for 1 days.      Patient reports: cough, nasal congestion, runny nose, Never used    Alcohol use: Not Currently      Comment: once a month - rare    Drug use: No        REVIEW OF SYSTEMS:   GENERAL: feels well otherwise, good appetite,   SKIN: no rashes or abnormal skin lesions  HEENT: See HPI  LUNGS: denies shortness of samantha respiratory symptoms. ASSESSMENT:   Viral upper respiratory tract infection  (primary encounter diagnosis)  Sore throat  Nasal congestion           PLAN:    COVID-19 rapid test and Rapid strep are negative.    Comfort care as described in Patient Instruc diet is fine. Stay well hydrated by drinking 6 to 8 glasses of fluids per day (water, soft drinks, juices, tea, or soup). Extra fluids will help loosen secretions in the nose and lungs.   · Over-the-counter cold medicines will not shorten the length of time

## 2025-02-13 ENCOUNTER — OFFICE VISIT (OUTPATIENT)
Dept: FAMILY MEDICINE CLINIC | Facility: CLINIC | Age: 60
End: 2025-02-13
Payer: COMMERCIAL

## 2025-02-13 VITALS
HEIGHT: 68 IN | WEIGHT: 162 LBS | TEMPERATURE: 100 F | RESPIRATION RATE: 16 BRPM | SYSTOLIC BLOOD PRESSURE: 150 MMHG | BODY MASS INDEX: 24.55 KG/M2 | HEART RATE: 94 BPM | OXYGEN SATURATION: 98 % | DIASTOLIC BLOOD PRESSURE: 96 MMHG

## 2025-02-13 DIAGNOSIS — J11.1 INFLUENZA-LIKE ILLNESS: Primary | ICD-10-CM

## 2025-02-13 DIAGNOSIS — J02.9 SORE THROAT: ICD-10-CM

## 2025-02-13 LAB
CONTROL LINE PRESENT WITH A CLEAR BACKGROUND (YES/NO): YES YES/NO
KIT LOT #: NORMAL NUMERIC

## 2025-02-13 PROCEDURE — 87637 SARSCOV2&INF A&B&RSV AMP PRB: CPT | Performed by: NURSE PRACTITIONER

## 2025-02-13 PROCEDURE — 87880 STREP A ASSAY W/OPTIC: CPT | Performed by: NURSE PRACTITIONER

## 2025-02-13 PROCEDURE — 3008F BODY MASS INDEX DOCD: CPT | Performed by: NURSE PRACTITIONER

## 2025-02-13 PROCEDURE — 3077F SYST BP >= 140 MM HG: CPT | Performed by: NURSE PRACTITIONER

## 2025-02-13 PROCEDURE — 99213 OFFICE O/P EST LOW 20 MIN: CPT | Performed by: NURSE PRACTITIONER

## 2025-02-13 PROCEDURE — 3080F DIAST BP >= 90 MM HG: CPT | Performed by: NURSE PRACTITIONER

## 2025-02-13 RX ORDER — OSELTAMIVIR PHOSPHATE 75 MG/1
75 CAPSULE ORAL 2 TIMES DAILY
Qty: 10 CAPSULE | Refills: 0 | Status: SHIPPED | OUTPATIENT
Start: 2025-02-13 | End: 2025-02-18

## 2025-02-13 RX ORDER — HYDROCORTISONE 25 MG/G
1 CREAM TOPICAL 2 TIMES DAILY
Qty: 1 EACH | Refills: 0 | Status: CANCELLED | OUTPATIENT
Start: 2025-02-13 | End: 2025-02-20

## 2025-02-14 NOTE — PATIENT INSTRUCTIONS
1. Rest. Drink plenty of fluids.  2. Tylenol/Ibuprofen for pain/fevers. Tamiflu as prescribed. (Stop it if the viral panel is negative for influenza A and influenza B).  3. Salt water gargles three times daily  4. Use humidifier at home when possible.  5. The rapid strep test was negative today.   6. Covid-19/FLU/RSV testing sent to lab.  Self quarantine at this time. If covid-19 test is positive then please follow the listed guidelines below:  Home isolation until:  Your symptoms are improving AND  You are fever free for 24 hours without using fever reducing medications  Resume normal activities, and use added prevention strategies over the next five days.  Clean your hands often  wear a well-fitting mask when around others  keep a distance from others    7. Follow up with PMD in 4-5 days for re-eval. Go to the emergency department immediately if symptoms worsen, change, you develop chest discomfort, wheezing, shortness of breath, or if you have any concerns.

## 2025-02-14 NOTE — PROGRESS NOTES
CHIEF COMPLAINT:     Chief Complaint   Patient presents with    Cold     Started today, cough, sore throat, body aches       HPI:   Rupinder Fuchs is a 59 year old female who presents for sore throat, nasal cognestion cough, body aches, and chills,  Patient reports symtpoms started this morning.  Denies any wheezing, chest discomfort, or shortness of breath.  Treating symptoms with otc meds.   Tolerates PO well at home. No n/v/d.  Denies any other aggravating or relieving factors at home. Denies any other treatment attempts prior to arrival.       Current Outpatient Medications   Medication Sig Dispense Refill    oseltamivir 75 MG Oral Cap Take 1 capsule (75 mg total) by mouth 2 (two) times daily for 5 days. 10 capsule 0    Multiple Vitamins-Minerals (MULTI-VITAMIN/MINERALS) Oral Tab Take 1 tablet by mouth daily.      Probiotic Product (ALIGN) Oral Cap       levothyroxine 112 MCG Oral Tab Take 1 tablet (112 mcg total) by mouth daily.      PEG 3350-KCl-Na Bicarb-NaCl 420 g Oral Recon Soln Take as directed by physician 4000 mL 0    PROAIR  (90 Base) MCG/ACT Inhalation Aero Soln INHALE 2 PUFFS BY MOUTH EVERY FOUR TO SIX HOURS AS NEEDED  2    mometasone 220 MCG/INH Inhalation Aerosol Powder, Breath Activated Inhale 1 puff into the lungs daily.      Ascorbic Acid (VITAMIN C OR) Take  by mouth.      Fluticasone Propionate (FLONASE NA) by Nasal route.      montelukast 10 MG Oral Tab Take 1 tablet (10 mg total) by mouth nightly.      Cholecalciferol (VITAMIN D) 2000 UNITS Oral Cap Take  by mouth.        Past Medical History:    Basal cell carcinoma    Bloating    occasional    Cancer (HCC)    5 areas of basal cell cancer    Chronic cough    with asthma, only when I have a cold    Extrinsic asthma, unspecified    10 years ago    Flatulence/gas pain/belching    normal    Hearing loss    chronic    Hypothyroid    Hypothyroidism    Lipid screening    Wears glasses      Past Surgical History:   Procedure Laterality  Date    Colonoscopy  2020    Other surgical history  2004    right ablation uterine    Other surgical history  2021    bilateral ear tubes    Skin surgery  09/08/2014    BCC, Right anterior shoulder, ED&C    Skin surgery  07/15/2019    Excision of BCC to right upper back    Tonsillectomy      Tubal ligation           Social History     Socioeconomic History    Marital status:    Tobacco Use    Smoking status: Never    Smokeless tobacco: Never   Vaping Use    Vaping status: Never Used   Substance and Sexual Activity    Alcohol use: Not Currently     Comment: once a month - rare    Drug use: No   Other Topics Concern    Caffeine Concern Yes    Exercise Yes     Social Drivers of Health      [facility] from [facility]    Housing Stability         REVIEW OF SYSTEMS:   GENERAL: + fever. Notes good appetite  SKIN: no rashes or abnormal skin lesions  HEENT: + sore throat, + sinus symptoms, Denies ear pain  LUNGS: + nonproductive cough, denies shortness of breath or wheezing,   CARDIOVASCULAR: denies chest pain or palpitations   GI: denies N/V/C or abdominal pain  NEURO: Denies headaches    EXAM:   BP (!) 150/96   Pulse 94   Temp 100.2 °F (37.9 °C) (Oral)   Resp 16   Ht 5' 8\" (1.727 m)   Wt 162 lb (73.5 kg)   SpO2 98%   BMI 24.63 kg/m²   GENERAL: well developed, well nourished,in no apparent distress  SKIN: no rashes,no suspicious lesions  HEAD: atraumatic, normocephalic.    EYES: conjunctiva clear  EARS: TM's intact and without erythema, no bulging, no retraction,no fluid, bony landmarks visualized. No erythema or swelling noted to ear canals or external ears.   NOSE: Nostrils patent, clear nasal discharge, nasal mucosa reddened   THROAT: Oral mucosa pink, moist. Posterior pharynx is erythematous. No exudates. No tonsillar hypertrophy noted.  No trismus. Uvula midline with no swelling. Voice clear/normal. No stridor  NECK: Supple, non-tender  LUNGS: clear to auscultation bilaterally, no  rales, wheezes or rhonchi. Breathing is non labored.  CARDIO: RRR without murmur  EXTREMITIES: no cyanosis, clubbing or edema  LYMPH:  No lymphadenopathy.        ASSESSMENT AND PLAN:       ICD-10-CM    1. Influenza-like illness  J11.1 SARS-CoV-2/Flu A and B/RSV by PCR ([facility])     SARS-CoV-2/Flu A and B/RSV by PCR ([facility])     oseltamivir 75 MG Oral Cap      2. Sore throat  J02.9 Strep A Assay W/Optic        Viral panel testing sent to lab.    Rapid strep negative.     Discussed physical exam and hpi with pt.  Pt has reassuring physical exam consistent with pharyngitis and mild viral uri symptoms.  No signs of pta or retropharyngeal infection.Lungs clear bilat. No respiratory distress noted. We discussed flu vs covid-19 vs strep vs other etiologies. Rapid strep test negative. Covid-19 test sent to lab.  Treatment options discussed with patient and explained in detail. We reviewed symptomatic care at home. Pt would like to start tamiflu and will stop it if viral panel is negative for influenza. The risks, benefits and potential side effects of possible medications were reviewed. Alternatives were discussed. Monitoring parameters and expected course outlined. We reviewed self quarantine guidelines. Patient to call PCP or go to emergency department if symptoms fail to respond as outlined, or worsen in any way. The patient agreed with the plan.      Patient Instructions   1. Rest. Drink plenty of fluids.  2. Tylenol/Ibuprofen for pain/fevers. Tamiflu as prescribed. (Stop it if the viral panel is negative for influenza A and influenza B).  3. Salt water gargles three times daily  4. Use humidifier at home when possible.  5. The rapid strep test was negative today.   6. Covid-19/FLU/RSV testing sent to lab.  Self quarantine at this time. If covid-19 test is positive then please follow the listed guidelines below:  Home isolation until:  Your symptoms are improving AND  You are fever free for 24 hours without using fever  reducing medications  Resume normal activities, and use added prevention strategies over the next five days.  Clean your hands often  wear a well-fitting mask when around others  keep a distance from others    7. Follow up with PMD in 4-5 days for re-eval. Go to the emergency department immediately if symptoms worsen, change, you develop chest discomfort, wheezing, shortness of breath, or if you have any concerns.          The patient indicates understanding of these issues and agrees to the plan.

## (undated) NOTE — LETTER
07/05/17        [place] [id number]      Dear Naseem Bailey,    Our records indicate that you have outstanding lab work and or testing that was ordered for you and has not yet been completed:          CBC With Kirstin

## (undated) NOTE — MR AVS SNAPSHOT
[facility] [address]  [facility]  [phone]               Thank you for choosing us for your health care visit with Lucía Saeed, GENARO.   We are glad to serve you and happy to provide you TSH and Free T4 [E]    Complete by:  Jun 01, 2017 (Approximate)    Thyroid profile includes: T4, free  and TSH   Assoc Dx:  Hypothyroidism due to Hashimoto's thyroiditis [E03.8, E06.3]                 Scheduling Instructions     Thursday June 01, 20 You can access your MyChart to more actively manage your health care and view more details from this visit by going to [email or web address].   If you've recently had a stay at the Hospital you can access your discharge instructions in [address] by drake You don’t need to join a gym. Home exercises work great.  Put more priority on exercise in your life                    Visit [facility] online at  [email or web address]

## (undated) NOTE — LETTER
ASTHMA ACTION PLAN for Elizabeth Espana     : 5/10/1965     Date: 2018  Provider:  Carie Saeed DO  Phone for doctor or clinic: [facility], [address] [place] [address] , [address] [id number] [facility]  [phone]

## (undated) NOTE — LETTER
ASTHMA ACTION PLAN for Javon Cardona     : 5/10/1965     Date: 2018  Provider:  Marcio Munoz DO  Phone for doctor or clinic: [facility], [address] [place] [address] , [address] [facility] [address] [id number] [facility]  [phone]